# Patient Record
Sex: FEMALE | Race: WHITE | NOT HISPANIC OR LATINO
[De-identification: names, ages, dates, MRNs, and addresses within clinical notes are randomized per-mention and may not be internally consistent; named-entity substitution may affect disease eponyms.]

---

## 2017-03-14 ENCOUNTER — APPOINTMENT (OUTPATIENT)
Dept: VASCULAR SURGERY | Facility: CLINIC | Age: 72
End: 2017-03-14

## 2019-02-22 NOTE — ASU PATIENT PROFILE, ADULT - PSH
H/O discectomy  cervical discectomy, placement of screws and plate  1999  H/O fasciotomy  right lower leg four compartment fasciotomy 5/13/2013  History of arthroscopy  left knee 1998, left elboe 1997, right elbow 2005  Injury of femoral artery  left groin explorating with common femoral aretery repair 5/21/2013  S/P compartment syndrome decompression    S/P femoral-popliteal bypass surgery  RLE  S/P Hernia Repair    S/P tonsillectomy  1954  Superficial femoral artery occlusion  right SFA agntiopslty stent  5/28/2013  Wound of lower extremity  excision of necrotic skin right lateral leg, complex closure and full thickness skin graft, latered closure medical thigh skin graft donor 5/28/2013

## 2019-02-22 NOTE — ASU PATIENT PROFILE, ADULT - PMH
Anemia    Anxiety    Asthma    Bulging lumbar disc  L5/ S1  CAD (Coronary Artery Disease)    Cardiac abnormality  cardiac  stents total 6-    2004 , 2007, 2008, 2011  CHF (Congestive Heart Failure)  6 stents  Compartment syndrome    Dextroscoliosis    Diabetes Mellitus Type II    Foraminal stenosis of lumbar region  L3/L4,  L4/L5  GERD (gastroesophageal reflux disease)    GIB (gastrointestinal bleeding)    Hyperlipidemia    Hypertension    Idiopathic thrombocytopenia  1955  Leg edema    MI (myocardial infarction)  2005, 2008, 2011  Migraines    Nephrolithiasis    Neuropathy    Obesity    Open leg wound    WILNER (Obstructive Sleep Apnea)    Osteomyelitis    Peripheral arterial disease    Schizophrenia    Sleep Apnea    Superficial femoral artery occlusion  stenting 5/2013

## 2019-02-22 NOTE — ASU PATIENT PROFILE, ADULT - VISION (WITH CORRECTIVE LENSES IF THE PATIENT USUALLY WEARS THEM):
Partially impaired: cannot see medication labels or newsprint, but can see obstacles in path, and the surrounding layout; can count fingers at arm's length/cataracts

## 2019-02-24 ENCOUNTER — TRANSCRIPTION ENCOUNTER (OUTPATIENT)
Age: 74
End: 2019-02-24

## 2019-02-25 ENCOUNTER — OUTPATIENT (OUTPATIENT)
Dept: OUTPATIENT SERVICES | Facility: HOSPITAL | Age: 74
LOS: 1 days | End: 2019-02-25
Payer: MEDICARE

## 2019-02-25 VITALS
SYSTOLIC BLOOD PRESSURE: 116 MMHG | DIASTOLIC BLOOD PRESSURE: 69 MMHG | HEART RATE: 68 BPM | HEIGHT: 61 IN | WEIGHT: 229.28 LBS | TEMPERATURE: 98 F | RESPIRATION RATE: 11 BRPM | OXYGEN SATURATION: 96 %

## 2019-02-25 VITALS
HEART RATE: 68 BPM | SYSTOLIC BLOOD PRESSURE: 122 MMHG | DIASTOLIC BLOOD PRESSURE: 69 MMHG | RESPIRATION RATE: 14 BRPM | OXYGEN SATURATION: 100 %

## 2019-02-25 DIAGNOSIS — H25.812 COMBINED FORMS OF AGE-RELATED CATARACT, LEFT EYE: ICD-10-CM

## 2019-02-25 LAB — GLUCOSE BLDC GLUCOMTR-MCNC: 133 MG/DL — HIGH (ref 70–99)

## 2019-02-25 PROCEDURE — 82962 GLUCOSE BLOOD TEST: CPT

## 2019-02-25 PROCEDURE — 66984 XCAPSL CTRC RMVL W/O ECP: CPT | Mod: LT

## 2019-02-25 PROCEDURE — C1780: CPT

## 2019-07-31 NOTE — ASU DISCHARGE PLAN (ADULT/PEDIATRIC). - TELE NUMBER
[Takes medication as prescribed] : takes [None] : Patient does not have any barriers to medication adherence [phone]

## 2024-08-16 NOTE — ASU PATIENT PROFILE, ADULT - TRANSFUSION HX COMMENT, PROFILE
Patient arrived for cardiac rehab session. Pt reports to have eaten prior to exercise session.  The patient was on continuous EKG monitoring throughout the session. The patient tolerated exercise session well with no complaints.    
x 2 for anemia